# Patient Record
Sex: FEMALE | Race: BLACK OR AFRICAN AMERICAN | NOT HISPANIC OR LATINO | Employment: OTHER | URBAN - METROPOLITAN AREA
[De-identification: names, ages, dates, MRNs, and addresses within clinical notes are randomized per-mention and may not be internally consistent; named-entity substitution may affect disease eponyms.]

---

## 2021-08-11 PROBLEM — C50.919 BREAST CANCER: Status: ACTIVE | Noted: 2021-08-11

## 2021-08-11 PROBLEM — N63.20 LEFT BREAST MASS: Status: ACTIVE | Noted: 2021-08-11

## 2021-11-08 PROBLEM — Z51.81 ENCOUNTER FOR MONITORING AROMATASE INHIBITOR THERAPY: Status: ACTIVE | Noted: 2021-11-08

## 2021-11-08 PROBLEM — Z79.811 ENCOUNTER FOR MONITORING AROMATASE INHIBITOR THERAPY: Status: ACTIVE | Noted: 2021-11-08

## 2021-11-08 PROBLEM — C50.912 MALIGNANT NEOPLASM OF LEFT BREAST IN FEMALE, ESTROGEN RECEPTOR POSITIVE: Status: ACTIVE | Noted: 2021-08-11

## 2021-11-08 PROBLEM — Z08 ENCOUNTER FOR FOLLOW-UP SURVEILLANCE OF BREAST CANCER: Status: ACTIVE | Noted: 2021-11-08

## 2021-11-08 PROBLEM — Z17.0 MALIGNANT NEOPLASM OF LEFT BREAST IN FEMALE, ESTROGEN RECEPTOR POSITIVE: Status: ACTIVE | Noted: 2021-08-11

## 2021-11-08 PROBLEM — Z85.3 ENCOUNTER FOR FOLLOW-UP SURVEILLANCE OF BREAST CANCER: Status: ACTIVE | Noted: 2021-11-08

## 2022-02-07 PROBLEM — Z08 ENCOUNTER FOR FOLLOW-UP SURVEILLANCE OF BREAST CANCER: Status: RESOLVED | Noted: 2021-11-08 | Resolved: 2022-02-07

## 2022-02-07 PROBLEM — Z85.3 ENCOUNTER FOR FOLLOW-UP SURVEILLANCE OF BREAST CANCER: Status: RESOLVED | Noted: 2021-11-08 | Resolved: 2022-02-07

## 2023-04-19 PROBLEM — S89.91XA INJURY OF RIGHT LEG: Status: ACTIVE | Noted: 2023-04-19

## 2023-04-19 PROBLEM — S89.201A: Status: ACTIVE | Noted: 2023-04-19

## 2024-04-18 ENCOUNTER — PATIENT OUTREACH (OUTPATIENT)
Dept: ADMINISTRATIVE | Facility: OTHER | Age: 69
End: 2024-04-18

## 2024-04-18 NOTE — PROGRESS NOTES
CHW - Outreach Attempt    Community Health Worker left a voicemail message for 1st attempt to contact patient regarding: SDOH  Community Health Worker to attempt to contact patient on home and sister number

## 2024-04-18 NOTE — PROGRESS NOTES
This Community Health Worker (CHW) completed Social Determinant of Health (SDOH)  Questionnaire with patient/caregiver via telephone today.    Patient denied any SDOH needs at this time.

## 2024-04-22 PROBLEM — K02.9 CARIES: Status: ACTIVE | Noted: 2024-04-22
